# Patient Record
Sex: FEMALE | Race: WHITE | NOT HISPANIC OR LATINO | Employment: FULL TIME | URBAN - NONMETROPOLITAN AREA
[De-identification: names, ages, dates, MRNs, and addresses within clinical notes are randomized per-mention and may not be internally consistent; named-entity substitution may affect disease eponyms.]

---

## 2018-03-28 ENCOUNTER — OFFICE VISIT (OUTPATIENT)
Dept: CARDIOLOGY | Facility: CLINIC | Age: 23
End: 2018-03-28

## 2018-03-28 VITALS
DIASTOLIC BLOOD PRESSURE: 70 MMHG | BODY MASS INDEX: 20.73 KG/M2 | OXYGEN SATURATION: 99 % | WEIGHT: 121.4 LBS | HEIGHT: 64 IN | SYSTOLIC BLOOD PRESSURE: 98 MMHG | HEART RATE: 83 BPM

## 2018-03-28 DIAGNOSIS — R00.2 PALPITATIONS: ICD-10-CM

## 2018-03-28 DIAGNOSIS — R07.9 CHEST PAIN, UNSPECIFIED TYPE: Primary | ICD-10-CM

## 2018-03-28 DIAGNOSIS — R14.0 ABDOMINAL BLOATING: ICD-10-CM

## 2018-03-28 DIAGNOSIS — R42 DIZZINESS: ICD-10-CM

## 2018-03-28 DIAGNOSIS — R53.83 FATIGUE, UNSPECIFIED TYPE: ICD-10-CM

## 2018-03-28 DIAGNOSIS — R55 SYNCOPE, UNSPECIFIED SYNCOPE TYPE: ICD-10-CM

## 2018-03-28 DIAGNOSIS — R06.02 SHORTNESS OF BREATH: ICD-10-CM

## 2018-03-28 PROBLEM — Z82.49 FAMILY HISTORY OF CORONARY ARTERY DISEASE: Status: ACTIVE | Noted: 2018-03-28

## 2018-03-28 PROCEDURE — 93000 ELECTROCARDIOGRAM COMPLETE: CPT | Performed by: INTERNAL MEDICINE

## 2018-03-28 PROCEDURE — 99205 OFFICE O/P NEW HI 60 MIN: CPT | Performed by: INTERNAL MEDICINE

## 2018-03-28 RX ORDER — AMOXICILLIN 500 MG/1
CAPSULE ORAL 2 TIMES DAILY
COMMUNITY
Start: 2018-03-27 | End: 2018-12-18

## 2018-03-28 NOTE — PROGRESS NOTES
Subjective   Lucy Harris is a 23 y.o. female     Chief Complaint   Patient presents with   • Chest Pain     onset 1 year ago, pressure midsternal radiates down left arm with left arm numbness    • Shortness of Breath     occurs with exertion and chest pain    • Loss of Consciousness     occurs while running and described as orthostatic dizziness, last episode 8 months ago    • Fatigue   • Palpitations     rapid heart beat        PROBLEM LIST:     1. Chest pain   2. Shortness of breath  3. palpitations  4. Syncope   5. Hypotension   6. Family H/o CAD     Specialty Problems     None            HPI:  Ms. Lucy Harris is a 23-year-old white female referred by Mirella Encarnacion for evaluation of chest pain, shortness of breath, and syncope.    For more than a year the patient has complained of chest discomfort.  Ms. 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  The patient describes this as a deep dull non-pleuritic left parasternal pain.  Symptoms lasts only minutes and are always self-limited.  Chest pain which occurs at rest is of a similar nature and also self-limited.  She would feel a sense of heaviness or tightness in his chest was followed by severe shortness of breath, and a graying out.  He could usually eased herself to the ground but occasionally rapid loss of consciousness.  On at least one occasion she lost control of her bladder during these episodes.  She would be unconscious for only a short period of time and would awaken without a postictal state or Venkat's paralysis.    The patient is also had syncope not related to physical activity.  Agents mother describes an episode when she was complaining of nausea and feeling weak and dizzy.  She had chest discomfort as well as shortness of breath.  She fell onto her mother's arms with complete loss of consciousness and had loss of bowel control.  Again she woke up without postictal state or Venkat's  paralysis.  Ms. Harris states that she sometimes feels palpitations during episodes of chest pain and dizziness but this isn't inconsistent association.  She describes frequent episodes of nausea and abdominal bloating.  She also describes orthostatic lightheadedness and presyncope but she has never had orthostatic syncope.  She states that when she was running with [employer] that she had difficulty keeping up with the same age.  Because of fatigue and shortness of breath.      The patient describes several episodes of orthopnea and PND.  She has had no lower extremity edema.  She describes palpitations as a rapid forceful heartbeat which generally is precipitated by physical activity but occasionally occurs at rest.  She has never had rest palpitations resulting in syncope.            CURRENT MEDICATION:    Current Outpatient Prescriptions   Medication Sig Dispense Refill   • amoxicillin (AMOXIL) 500 MG capsule 2 (Two) Times a Day.       No current facility-administered medications for this visit.        ALLERGIES:    Review of patient's allergies indicates no known allergies.    PAST MEDICAL HISTORY:    Past Medical History:   Diagnosis Date   • Hypoglycemia        SURGICAL HISTORY:    No past surgical history on file.    SOCIAL HISTORY:    Social History     Social History   • Marital status: Single     Spouse name: N/A   • Number of children: N/A   • Years of education: N/A     Occupational History   • Not on file.     Social History Main Topics   • Smoking status: Never Smoker   • Smokeless tobacco: Never Used   • Alcohol use No   • Drug use: No   • Sexual activity: Defer     Other Topics Concern   • Not on file     Social History Narrative   • No narrative on file       FAMILY HISTORY:    Family History   Problem Relation Age of Onset   • Heart attack Mother    • Heart disease Mother    • Heart attack Father    • Asthma Father    • Heart attack Maternal Grandfather    • Heart disease Maternal Grandfather    • Heart  "disease Paternal Grandfather    • Asthma Sister    • Asthma Brother        Review of Systems   Constitutional: Positive for fatigue. Negative for chills, diaphoresis and fever.   HENT: Positive for sore throat, trouble swallowing and voice change.    Eyes: Negative for visual disturbance.   Respiratory: Positive for chest tightness and shortness of breath. Negative for cough, choking, wheezing and stridor.    Cardiovascular: Positive for chest pain (onset 1 year ago, pressure like, mid sternal radiates to left arm with arm nubness, associate symptoms shortness of breath  ) and palpitations.   Gastrointestinal: Positive for nausea (with abdominal bloating/ swelling, intermittent ). Negative for abdominal pain, blood in stool (no melena, no hematuria, no hematemesis, no hematochezia ), constipation, diarrhea and vomiting.   Endocrine: Positive for cold intolerance. Negative for heat intolerance.   Musculoskeletal: Negative for arthralgias, back pain, gait problem, joint swelling, myalgias, neck pain and neck stiffness.   Skin: Negative for color change, pallor, rash and wound.   Allergic/Immunologic: Negative for environmental allergies, food allergies and immunocompromised state.   Neurological: Positive for dizziness (descibed as orthostatic dizziness), syncope (last episode 8 months ago, did have 1 episode where she lost control of bowel and bladder), weakness and light-headedness. Negative for tremors, seizures, facial asymmetry, speech difficulty, numbness and headaches.   Hematological: Negative for adenopathy. Does not bruise/bleed easily.       VISIT VITALS:  Vitals:    03/28/18 1206   BP: 98/70   BP Location: Left arm   Patient Position: Sitting   Pulse: 83   SpO2: 99%   Weight: 55.1 kg (121 lb 6.4 oz)   Height: 162.6 cm (64\")      BP 98/70 (BP Location: Left arm, Patient Position: Sitting)   Pulse 83   Ht 162.6 cm (64\")   Wt 55.1 kg (121 lb 6.4 oz)   SpO2 99%   BMI 20.84 kg/m²     RECENT " LABS:    Objective       Physical Exam   Constitutional: She is oriented to person, place, and time. She appears well-developed and well-nourished. No distress.   HENT:   Head: Normocephalic and atraumatic.   Mouth/Throat: Uvula is midline. No oral lesions.   Eyes: Conjunctivae, EOM and lids are normal. Pupils are equal, round, and reactive to light. Lids are everted and swept, no foreign bodies found.   Negative ptosis  Negative lid lag  Negative arcus senilis      Neck: Normal range of motion. Neck supple. Normal carotid pulses, no hepatojugular reflux and no JVD present. Carotid bruit is not present. No tracheal deviation present. No thyroid mass and no thyromegaly present.   Cardiovascular: Normal rate, regular rhythm, S1 normal, S2 normal, normal heart sounds and intact distal pulses.  Exam reveals no gallop, no S3, no S4, no distant heart sounds and no friction rub.    No murmur heard.   No systolic murmur is present    No diastolic murmur is present   Pulses:       Carotid pulses are 2+ on the right side, and 2+ on the left side.       Radial pulses are 2+ on the right side, and 2+ on the left side.        Dorsalis pedis pulses are 2+ on the right side, and 2+ on the left side.        Posterior tibial pulses are 2+ on the right side, and 2+ on the left side.   Pulmonary/Chest: Effort normal and breath sounds normal. She has no decreased breath sounds. She has no wheezes. She has no rhonchi. She has no rales.   Abdominal: Soft. Bowel sounds are normal. She exhibits no distension, no abdominal bruit and no mass. There is no tenderness. There is no rebound and no guarding.   Negative organomegaly      Musculoskeletal: Normal range of motion. She exhibits no edema (normal capillary refill BLE), tenderness or deformity.   Lymphadenopathy:     She has no cervical adenopathy.     She has no axillary adenopathy.   Neurological: She is alert and oriented to person, place, and time.   Skin: Skin is warm and dry. No  rash noted. No erythema. No pallor.   Psychiatric: She has a normal mood and affect. Her speech is normal and behavior is normal. Judgment and thought content normal. Cognition and memory are normal.   Nursing note and vitals reviewed.        ECG 12 Lead  Date/Time: 3/28/2018 12:12 PM  Performed by: SHANNA TAN  Authorized by: SHANNA TAN                 Assessment/Plan    #1.  Recurrent syncope.  This appears to be often related to physical activity.  Therefore, exercise induced dysrhythmia, inadequate chronotropic reserve, in pathology which might restrict for cardiac output (such as clinically occult mitral stenosis) need to be excluded.    #2.  Chest pain with exertion and at rest, with some features compatible with ischemia.  The patient is a very low risk for coronary artery disease.  However, congenital coronary and artery anomalies are a consideration.    #3.  I'm also concerned about the patient's abdominal throat bloating, frequent nausea and other constitutional symptoms.  Although I can't put together a single causative  entity, constitutional complaints associated with chest pain, palpitations, shortness of breath and syncope are very worrisome for systemic illness.    #4.  Based on numbers 1 through 3 above we will perform treadmill stress testing as an ischemia assessment and to look for exercise induced dysrhythmia or chronotropic incompetence.  We will perform an echocardiogram to assess LV systolic and diastolic performance, LV filling pressures, pulmonary pressures, and a look for occult valvular disease.    #5..  I would like Ms. Harris to wear her 30 day event monitor to further evaluate for primary dysrhythmic activity.    #6.  . We will also obtain baseline labs to include electrolytes, CBC, thyroid function studies, CRP and sedimentation rate, and I would also like to obtain a d-dimer given the patient's rest chest pain shortness of breath and syncope.    #7.  The patient will  attempt to monitor recorder heart rate and blood pressure whenever she is symptomatic.    #8.  The patient will follow-up with Kartik Encarnacion's office instructions, and in our office after testing or on a when necessary basis for symptoms as discussed in detail today.             Diagnosis Plan   1. Chest pain, unspecified type  ECG 12 Lead   2. Shortness of breath  ECG 12 Lead   3. Palpitations  ECG 12 Lead   4. Syncope, unspecified syncope type     5. Dizziness         No Follow-up on file.              Discussed the patient's BMI with her. BMI is within normal parameters. No follow-up required.       Sherman Solano MA   Scribed for Dr. Armando Carl by WENDY Nino March 28, 2018 12:26 PM               Electronically signed by:            This note is dictated utilizing voice recognition software.  Although this record has been proof read, transcriptional errors may still be present. If questions occur regarding the content of this record please do not hesitate to call our office.

## 2018-03-29 ENCOUNTER — TELEPHONE (OUTPATIENT)
Dept: CARDIOLOGY | Facility: CLINIC | Age: 23
End: 2018-03-29

## 2018-03-29 NOTE — TELEPHONE ENCOUNTER
PATIENT'S MOTHER STEWART PINEDA CALLED THE OFFICE AND REQUEST TO CANCEL ALL TESTING AND EVENT MONITOR DUE TO HER DAUGHTER STARTING A NEW JOB.

## 2018-12-18 ENCOUNTER — OFFICE VISIT (OUTPATIENT)
Dept: CARDIOLOGY | Facility: CLINIC | Age: 23
End: 2018-12-18

## 2018-12-18 VITALS
BODY MASS INDEX: 19.29 KG/M2 | HEART RATE: 96 BPM | WEIGHT: 113 LBS | OXYGEN SATURATION: 100 % | HEIGHT: 64 IN | SYSTOLIC BLOOD PRESSURE: 114 MMHG | DIASTOLIC BLOOD PRESSURE: 76 MMHG

## 2018-12-18 DIAGNOSIS — R00.2 PALPITATIONS: ICD-10-CM

## 2018-12-18 DIAGNOSIS — R42 DIZZINESS: ICD-10-CM

## 2018-12-18 DIAGNOSIS — R55 PRE-SYNCOPE: Primary | ICD-10-CM

## 2018-12-18 DIAGNOSIS — R07.2 PRECORDIAL PAIN: ICD-10-CM

## 2018-12-18 PROCEDURE — 99213 OFFICE O/P EST LOW 20 MIN: CPT | Performed by: PHYSICIAN ASSISTANT

## 2018-12-18 RX ORDER — NORGESTIMATE AND ETHINYL ESTRADIOL 7DAYSX3 28
KIT ORAL DAILY
COMMUNITY
Start: 2018-12-10

## 2018-12-18 NOTE — PROGRESS NOTES
Problem list     Subjective   Lucy Harirs is a 23 y.o. female     Chief Complaint   Patient presents with   • Syncope     patient reports near syncopal episode yesterday; went to [facility] in [facility]  The patient presents back for evaluation.  We'd seen her initially in the setting of dizziness and presyncope at last visit.  She was recommended to have cardiac testing.  Because she started a new job, she did not have those performed.  She continues to have dizziness and presyncope.  She had to go to the [facility] clinic yesterday because of a presyncopal episode.  Laboratories, EKG, and orthostatic blood pressure checks were normal at that time by review and by patient report.  Still, with continued episodes, it was recommended she have repeat evaluation to the clinic.  She tells me that she has anywhere from 1-3 episodes of presyncope every month.  She can have this with exertion/work or at rest.  She has onset of weakness, followed by dizziness, intermittent tachycardia, and rare chest discomfort at that time.  She would like to reschedule her cardiac testing at this time that she did not have previously.    Current Outpatient Medications   Medication Sig Dispense Refill   • TRI-SPRINTEC 0.18/0.215/0.25 MG-35 MCG per tablet Daily.       No current facility-administered medications for this visit.        Patient has no known allergies.    Past Medical History:   Diagnosis Date   • Hypoglycemia        Social History     Socioeconomic History   • Marital status: Single     Spouse name: Not on file   • Number of children: Not on file   • Years of education: Not on file   • Highest education level: Not on file   Social Needs   • Financial resource strain: Not on file   • Food insecurity - worry: Not on file   • Food insecurity - inability: Not on file   • Transportation needs - medical: Not on file   • Transportation needs - non-medical: Not on file   Occupational History   • Not on file   Tobacco Use   •  Smoking status: Never Smoker   • Smokeless tobacco: Never Used   Substance and Sexual Activity   • Alcohol use: No   • Drug use: No   • Sexual activity: Defer   Other Topics Concern   • Not on file   Social History Narrative   • Not on file       Family History   Problem Relation Age of Onset   • Heart attack Mother    • Heart disease Mother    • Heart attack Father    • Asthma Father    • Heart attack Maternal Grandfather    • Heart disease Maternal Grandfather    • Heart disease Paternal Grandfather    • Asthma Sister    • Asthma Brother        Review of Systems   Constitutional: Negative.  Negative for fatigue.   HENT: Positive for sneezing (sneezing due to allergies). Negative for congestion, rhinorrhea and sore throat.    Eyes: Negative.  Negative for visual disturbance.   Respiratory: Negative.  Negative for apnea, cough, chest tightness, shortness of breath (denies SOA) and wheezing.    Cardiovascular: Negative.  Negative for chest pain (denies CP), palpitations (denies palpitations) and leg swelling.   Gastrointestinal: Negative.  Negative for abdominal distention, abdominal pain, nausea and vomiting.   Endocrine: Negative.  Negative for cold intolerance and heat intolerance.   Genitourinary: Negative.  Negative for difficulty urinating, frequency and urgency.   Musculoskeletal: Negative.  Negative for arthralgias, back pain, myalgias, neck pain and neck stiffness.   Skin: Negative.  Negative for rash and wound.   Allergic/Immunologic: Negative.  Negative for environmental allergies and food allergies.   Neurological: Positive for syncope (near syncopal episode yesterday), numbness (numbness in face and arm with near syncope spells) and headaches (increased H/A's). Negative for dizziness and weakness.   Hematological: Negative.  Does not bruise/bleed easily.   Psychiatric/Behavioral: Positive for confusion (easily confused). Negative for agitation and sleep disturbance (denies waking up smothering/SOA). The  "patient is nervous/anxious (easily nervous).        Objective   Vitals:    12/18/18 1022   BP: 114/76   BP Location: Left arm   Patient Position: Sitting   Pulse: 96   SpO2: 100%   Weight: 51.3 kg (113 lb)   Height: 162.6 cm (64\")      /76 (BP Location: Left arm, Patient Position: Sitting)   Pulse 96   Ht 162.6 cm (64\")   Wt 51.3 kg (113 lb)   SpO2 100%   BMI 19.40 kg/m²    Lab Results (most recent)     None        Physical Exam   Constitutional: She is oriented to person, place, and time. She appears well-developed and well-nourished. No distress.   HENT:   Head: Normocephalic and atraumatic.   Eyes: Conjunctivae and EOM are normal. Pupils are equal, round, and reactive to light.   Neck: Normal range of motion. Neck supple. No JVD present. No tracheal deviation present.   Cardiovascular: Normal rate, regular rhythm, normal heart sounds and intact distal pulses.   Pulmonary/Chest: Effort normal and breath sounds normal.   Abdominal: Soft. Bowel sounds are normal. She exhibits no distension and no mass. There is no tenderness. There is no rebound and no guarding.   Musculoskeletal: Normal range of motion. She exhibits no edema, tenderness or deformity.   Neurological: She is alert and oriented to person, place, and time.   Skin: Skin is warm and dry. No rash noted. No erythema. No pallor.   Psychiatric: She has a normal mood and affect. Her behavior is normal. Judgment and thought content normal.   Nursing note and vitals reviewed.        Procedure   Procedures       Assessment/Plan      Diagnosis Plan   1. Pre-syncope  Adult Transthoracic Echo Complete W/ Cont if Necessary Per Protocol    Treadmill Stress Test   2. Dizziness  Adult Transthoracic Echo Complete W/ Cont if Necessary Per Protocol    Treadmill Stress Test   3. Palpitations  Adult Transthoracic Echo Complete W/ Cont if Necessary Per Protocol    Treadmill Stress Test   4. Precordial pain  Adult Transthoracic Echo Complete W/ Cont if Necessary " Per Protocol    Treadmill Stress Test       Because of recurrent presyncopal episodes, I would like the patient have cardiac evaluation.  She never had studies as was ordered at last evaluation.  I'll schedule for regular treadmill stress test and an echo.  If those studies are unremarkable, I would make consideration for a tilt table test.  We had recommended at 30 day event monitor, and the patient just simply does not want to do that.  She is currently wearing a Holter monitor from her local provider.  We will await those results.  We will correlate that with her stress and echo as above and recommend further at her follow-up.  For recurrent issues, she will call.  Again, by report, her labs, orthostatic blood pressure checks, EKGs, etc. have been normal          Patient's Body mass index is 19.4 kg/m². BMI is below normal parameters. Recommendations include: referral to primary care.             Electronically signed by: